# Patient Record
Sex: MALE | Race: WHITE | NOT HISPANIC OR LATINO | Employment: OTHER | URBAN - METROPOLITAN AREA
[De-identification: names, ages, dates, MRNs, and addresses within clinical notes are randomized per-mention and may not be internally consistent; named-entity substitution may affect disease eponyms.]

---

## 2022-06-07 DIAGNOSIS — M79.641 PAIN IN RIGHT HAND: Primary | ICD-10-CM

## 2023-03-04 ENCOUNTER — HOSPITAL ENCOUNTER (EMERGENCY)
Facility: HOSPITAL | Age: 43
Discharge: HOME | End: 2023-03-04
Attending: EMERGENCY MEDICINE
Payer: MEDICAID

## 2023-03-04 VITALS
DIASTOLIC BLOOD PRESSURE: 75 MMHG | HEIGHT: 69 IN | RESPIRATION RATE: 18 BRPM | SYSTOLIC BLOOD PRESSURE: 117 MMHG | WEIGHT: 257.25 LBS | OXYGEN SATURATION: 99 % | TEMPERATURE: 98 F | HEART RATE: 82 BPM | BODY MASS INDEX: 38.1 KG/M2

## 2023-03-04 DIAGNOSIS — I10 HYPERTENSION, UNSPECIFIED TYPE: Primary | ICD-10-CM

## 2023-03-04 DIAGNOSIS — R07.9 CHEST PAIN: ICD-10-CM

## 2023-03-04 LAB
ALBUMIN SERPL-MCNC: 4.2 G/DL (ref 3.5–5)
ALBUMIN/GLOB SERPL: 1.4 RATIO (ref 1.1–2)
ALP SERPL-CCNC: 74 UNIT/L (ref 40–150)
ALT SERPL-CCNC: 118 UNIT/L (ref 0–55)
AST SERPL-CCNC: 37 UNIT/L (ref 5–34)
BASOPHILS # BLD AUTO: 0.04 X10(3)/MCL (ref 0–0.2)
BASOPHILS NFR BLD AUTO: 0.6 %
BILIRUBIN DIRECT+TOT PNL SERPL-MCNC: 0.5 MG/DL
BNP BLD-MCNC: <10 PG/ML
BUN SERPL-MCNC: 14 MG/DL (ref 8.9–20.6)
CALCIUM SERPL-MCNC: 9.5 MG/DL (ref 8.4–10.2)
CHLORIDE SERPL-SCNC: 105 MMOL/L (ref 98–107)
CK MB SERPL-MCNC: 1.3 NG/ML
CK SERPL-CCNC: 143 U/L (ref 30–200)
CO2 SERPL-SCNC: 26 MMOL/L (ref 22–29)
CREAT SERPL-MCNC: 1.1 MG/DL (ref 0.73–1.18)
D DIMER PPP IA.FEU-MCNC: <0.27 UG/ML FEU (ref 0–0.5)
EOSINOPHIL # BLD AUTO: 0.17 X10(3)/MCL (ref 0–0.9)
EOSINOPHIL NFR BLD AUTO: 2.6 %
ERYTHROCYTE [DISTWIDTH] IN BLOOD BY AUTOMATED COUNT: 11.7 % (ref 11.5–17)
GFR SERPLBLD CREATININE-BSD FMLA CKD-EPI: >60 MLS/MIN/1.73/M2
GLOBULIN SER-MCNC: 3.1 GM/DL (ref 2.4–3.5)
GLUCOSE SERPL-MCNC: 89 MG/DL (ref 74–100)
HCT VFR BLD AUTO: 45.8 % (ref 42–52)
HGB BLD-MCNC: 15.3 G/DL (ref 14–18)
IMM GRANULOCYTES # BLD AUTO: 0.01 X10(3)/MCL (ref 0–0.04)
IMM GRANULOCYTES NFR BLD AUTO: 0.2 %
LYMPHOCYTES # BLD AUTO: 2.47 X10(3)/MCL (ref 0.6–4.6)
LYMPHOCYTES NFR BLD AUTO: 37.5 %
MCH RBC QN AUTO: 29.6 PG
MCHC RBC AUTO-ENTMCNC: 33.4 G/DL (ref 33–36)
MCV RBC AUTO: 88.6 FL (ref 80–94)
MONOCYTES # BLD AUTO: 0.57 X10(3)/MCL (ref 0.1–1.3)
MONOCYTES NFR BLD AUTO: 8.6 %
NEUTROPHILS # BLD AUTO: 3.33 X10(3)/MCL (ref 2.1–9.2)
NEUTROPHILS NFR BLD AUTO: 50.5 %
NRBC BLD AUTO-RTO: 0 %
PLATELET # BLD AUTO: 236 X10(3)/MCL (ref 130–400)
PMV BLD AUTO: 10.7 FL (ref 7.4–10.4)
POTASSIUM SERPL-SCNC: 3.8 MMOL/L (ref 3.5–5.1)
PROT SERPL-MCNC: 7.3 GM/DL (ref 6.4–8.3)
RBC # BLD AUTO: 5.17 X10(6)/MCL (ref 4.7–6.1)
SODIUM SERPL-SCNC: 141 MMOL/L (ref 136–145)
TROPONIN I SERPL-MCNC: <0.01 NG/ML (ref 0–0.04)
WBC # SPEC AUTO: 6.6 X10(3)/MCL (ref 4.5–11.5)

## 2023-03-04 PROCEDURE — 83880 ASSAY OF NATRIURETIC PEPTIDE: CPT | Performed by: PHYSICIAN ASSISTANT

## 2023-03-04 PROCEDURE — 25000242 PHARM REV CODE 250 ALT 637 W/ HCPCS: Performed by: PHYSICIAN ASSISTANT

## 2023-03-04 PROCEDURE — 85025 COMPLETE CBC W/AUTO DIFF WBC: CPT | Performed by: PHYSICIAN ASSISTANT

## 2023-03-04 PROCEDURE — 82550 ASSAY OF CK (CPK): CPT | Performed by: PHYSICIAN ASSISTANT

## 2023-03-04 PROCEDURE — 84484 ASSAY OF TROPONIN QUANT: CPT | Performed by: PHYSICIAN ASSISTANT

## 2023-03-04 PROCEDURE — 85379 FIBRIN DEGRADATION QUANT: CPT | Performed by: PHYSICIAN ASSISTANT

## 2023-03-04 PROCEDURE — 93005 ELECTROCARDIOGRAM TRACING: CPT

## 2023-03-04 PROCEDURE — 80053 COMPREHEN METABOLIC PANEL: CPT | Performed by: PHYSICIAN ASSISTANT

## 2023-03-04 PROCEDURE — 82553 CREATINE MB FRACTION: CPT | Performed by: PHYSICIAN ASSISTANT

## 2023-03-04 PROCEDURE — 99285 EMERGENCY DEPT VISIT HI MDM: CPT | Mod: 25

## 2023-03-04 RX ORDER — AMLODIPINE BESYLATE 5 MG/1
5 TABLET ORAL DAILY
Qty: 30 TABLET | Refills: 0 | Status: SHIPPED | OUTPATIENT
Start: 2023-03-04 | End: 2023-04-03

## 2023-03-04 RX ORDER — NITROGLYCERIN 0.4 MG/1
0.4 TABLET SUBLINGUAL
Status: COMPLETED | OUTPATIENT
Start: 2023-03-04 | End: 2023-03-04

## 2023-03-04 RX ADMIN — NITROGLYCERIN 0.4 MG: 0.4 TABLET SUBLINGUAL at 06:03

## 2023-03-05 NOTE — ED PROVIDER NOTES
"Encounter Date: 3/4/2023       History     Chief Complaint   Patient presents with    Chest Pain     C/o left chest pain radiating under left shoulder since yesterday. States recently started on nicotine patches     Krish Prieto is a 42 y.o. male who presents to the ED with complaints of chest pain that radiates to his left arm that started 1 day(s) ago off and on. 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 Recntly started wearing a nicotine patch. He states he feels short of breath and has off and on swelling in the bilateral lower extremities. States his mom passed away at 57 y/o from a heart attack and dad has heart disease. States he sees Dr. David Adams but only takes medication for bipolar.  He denies nausea, vomiting, dizziness, headache, syncope.     The history is provided by the patient. No  was used.   Review of patient's allergies indicates:   Allergen Reactions    Prednisone Other (See Comments)     "MAKES ME CRAZY"  Other reaction(s): .       History reviewed. No pertinent past medical history.  History reviewed. No pertinent surgical history.  History reviewed. No pertinent family history.  Social History     Tobacco Use    Smoking status: Former     Types: Cigarettes    Smokeless tobacco: Never   Substance Use Topics    Alcohol use: Not Currently    Drug use: Not Currently     Types: Marijuana, Methamphetamines     Review of Systems   Constitutional:  Negative for chills, fatigue and fever.   HENT:  Negative for congestion, ear pain, sinus pain and sore throat.    Eyes:  Negative for pain.   Respiratory:  Positive for shortness of breath. Negative for cough, chest tightness and wheezing.    Cardiovascular:  Positive for chest pain. Negative for leg swelling.   Gastrointestinal:  Negative for abdominal pain, constipation, diarrhea, nausea and vomiting.   Genitourinary:  Negative for dysuria and flank pain. "   Musculoskeletal:  Negative for back pain and joint swelling.   Skin:  Negative for color change and rash.   Neurological:  Negative for dizziness, weakness and headaches.   Psychiatric/Behavioral:  Negative for behavioral problems and confusion.      Physical Exam     Initial Vitals [03/04/23 1744]   BP Pulse Resp Temp SpO2   (!) 164/101 106 20 98.1 °F (36.7 °C) 98 %      MAP       --         Physical Exam    Nursing note and vitals reviewed.  Constitutional: He appears well-developed and well-nourished.   HENT:   Head: Normocephalic and atraumatic.   Eyes: Conjunctivae and EOM are normal. Pupils are equal, round, and reactive to light.   Neck: Neck supple. No JVD present.   Normal range of motion.  Cardiovascular:  Normal rate, regular rhythm, normal heart sounds and intact distal pulses.           No murmur heard.  Pulmonary/Chest: Breath sounds normal. No respiratory distress. He has no wheezes. He has no rhonchi. He has no rales.   Abdominal: Abdomen is soft. Bowel sounds are normal. He exhibits no distension. There is no abdominal tenderness. There is no rebound.   Musculoskeletal:         General: No tenderness. Normal range of motion.      Cervical back: Normal range of motion and neck supple.     Lymphadenopathy:     He has no cervical adenopathy.   Neurological: He is alert and oriented to person, place, and time. GCS score is 15. GCS eye subscore is 4. GCS verbal subscore is 5. GCS motor subscore is 6.   Skin: Skin is warm. Capillary refill takes less than 2 seconds.   Psychiatric: He has a normal mood and affect. Thought content normal.       ED Course   Procedures  Labs Reviewed   COMPREHENSIVE METABOLIC PANEL - Abnormal; Notable for the following components:       Result Value    Alanine Aminotransferase 118 (*)     Aspartate Aminotransferase 37 (*)     All other components within normal limits   CBC WITH DIFFERENTIAL - Abnormal; Notable for the following components:    MPV 10.7 (*)     All other  components within normal limits   TROPONIN I - Normal   CK - Normal   CK-MB - Normal   D DIMER, QUANTITATIVE - Normal   B-TYPE NATRIURETIC PEPTIDE - Normal   CBC W/ AUTO DIFFERENTIAL    Narrative:     The following orders were created for panel order CBC auto differential.  Procedure                               Abnormality         Status                     ---------                               -----------         ------                     CBC with Differential[[id number]]        Abnormal            Final result                 Please view results for these tests on the individual orders.     EKG Readings: (Independently Interpreted)   Initial Reading: No STEMI. Rhythm: Sinus Tachycardia. Heart Rate: 102. ST Segments: Normal ST Segments.   Nonspecific T wave abnormality   ECG Results              EKG 12-lead (Chest Pain) Age >30 (In process)  Result time 03/04/23 17:52:53      In process by Interface, Lab In [facility] (03/04/23 17:52:53)                   Narrative:    Test Reason : R07.9,    Vent. Rate : 102 BPM     Atrial Rate : 102 BPM     P-R Int : 150 ms          QRS Dur : 100 ms      QT Int : 360 ms       P-R-T Axes : 063 047 -02 degrees     QTc Int : 469 ms    Sinus tachycardia  Nonspecific T wave abnormality  Abnormal ECG  No previous ECGs available    Referred By:             Confirmed By:                                   Imaging Results              X-Ray Chest 1 View (Final result)  Result time 03/04/23 18:54:40      Final result by Juventino Ocampo MD (03/04/23 18:54:40)                   Impression:      NO ACUTE CARDIOPULMONARY PROCESS IDENTIFIED.      Electronically signed by: Juventino Ocampo  Date:    03/04/2023  Time:    18:54               Narrative:    EXAMINATION:  XR CHEST 1 VIEW    CLINICAL HISTORY:  chest pain;    TECHNIQUE:  One view    COMPARISON:  October 27, 2020.    FINDINGS:  Cardiopericardial silhouette is within normal limits. Lungs are without dense focal or segmental consolidation,  congestive process, pleural effusions or pneumothorax.                                       Medications   nitroGLYCERIN SL tablet 0.4 mg (0.4 mg Sublingual Given 3/4/23 1849)                 ED Course as of 03/04/23 2043   Sat Mar 04, 2023   2040 Reassessed patient at this time. BP now 112/76 after nitro and pain 0/10. Will refer to cardiology to get established and will DC home with low dose blood pressure medication. Strict ED return precautions. He verbalized understanding. Stable for discharge.  [VJ]      ED Course User Index  [VJ] Dottie Giron PA-C                 Clinical Impression:   Final diagnoses:  [R07.9] Chest pain  [I10] Hypertension, unspecified type (Primary)        ED Disposition Condition    Discharge Stable          ED Prescriptions       Medication Sig Dispense Start Date End Date Auth. Provider    amLODIPine (NORVASC) 5 MG tablet Take 1 tablet (5 mg total) by mouth once daily. 30 tablet 3/4/2023 4/3/2023 Dottie Giron PA-C          Follow-up Information       Follow up With Specialties Details Why Contact Info    [facility]  In 1 week  [address] [id number]    [facility] Emergency Medicine In 3 days As needed, If symptoms worsen [address] [address] [phone]             Dottie Giron PA-C  03/04/23 2043

## 2023-05-15 NOTE — PROGRESS NOTES
CHIEF COMPLAINT:   Chief Complaint   Patient presents with    New patient for c/o chest pain     Patient c/o having occasional tightness in chest                                                  HPI:  Krish Prieto 43 y.o. male who presents to [facility] as an initial referral for further evaluation of chest pain.  The patient had an emergency room visit in 2023 with complaints of chest pain and shortness of breath with minimal to moderate exertion. The patient reports his symptoms began while in rehab.  He reports that he was talking in a group setting, when he developed a left-sided tightness/shooting non- radiating pain.  Upon arrival to the ED, the patient was noted to be markedly hypertensive with blood pressure 164/101. Troponin was negative. EKG - ST, with nonspecific T-wave abnormality.  He reports that he was given nitroglycerin which ultimately relieved his symptoms.    It appears the patient was discharged with amlodipine.  However, he is currently not taking the medicine because his blood pressure is managed with diet and exercise modifications.      Today the patient denies any further episodes of chest pain.  He reports that his symptoms of shortness of breath have significantly the improve since he has quit smoking he in 2023.  He denies any current palpitations orthopnea, PND, peripheral edema, claudication symptoms, lightheadedness or syncope.  Patient states that he walks a mi daily and weight training sternal times a week without experiencing any chest pain or significant shortness of breath.  The patient does endorse if significant family history CAD, states that his mild has CAD/CABG  of a MI at age 56 and a has PAD.           Family History: Mom- CAD/CABG/PAD;  of 56 of MI; Dad-PAD;   Social History: Smoked a pack a day of cigarettes since age 14. Report quit smoking in 2023. Former methamphetamine and marijuana use. Sober for 93 days. Denies any  "ETOH use                                                                                                                                                                                                                                                                                                                                                                                                                                                                            CARDIAC TESTING:         Patient Active Problem List   Diagnosis    Chest pain     History reviewed. No pertinent surgical history.  Social History     Socioeconomic History    Marital status:    Tobacco Use    Smoking status: Former     Types: Cigarettes    Smokeless tobacco: Never   Substance and Sexual Activity    Alcohol use: Not Currently    Drug use: Not Currently     Types: Marijuana, Methamphetamines        History reviewed. No pertinent family history.  Review of patient's allergies indicates:   Allergen Reactions    Prednisone Other (See Comments)     "MAKES ME CRAZY"  Other reaction(s): .           ROS:  Review of Systems   Constitutional: Negative.    HENT: Negative.     Eyes: Negative.    Respiratory:  Positive for shortness of breath.    Cardiovascular:  Positive for chest pain.   Gastrointestinal: Negative.    Genitourinary: Negative.    Musculoskeletal: Negative.    Skin: Negative.    Neurological: Negative.    Endo/Heme/Allergies: Negative.    Psychiatric/Behavioral: Negative.                                                                                                                                                                                Negative except as stated in the history of present illness. See HPI for details.    PHYSICAL EXAM:  Visit Vitals  /80 (BP Location: Left arm, Patient Position: Sitting, BP Method: X-Large (Automatic))   Pulse 84   Temp 98.5 °F (36.9 °C) (Oral)   Resp 20   Ht 5' 9" (1.753 m)   Wt " "116.8 kg (257 lb 8 oz)   SpO2 96%   BMI 38.03 kg/m²       Physical Exam  Constitutional:       Appearance: Normal appearance.   HENT:      Head: Normocephalic.   Eyes:      Pupils: Pupils are equal, round, and reactive to light.   Cardiovascular:      Rate and Rhythm: Normal rate and regular rhythm.      Pulses: Normal pulses.   Pulmonary:      Effort: Pulmonary effort is normal.   Musculoskeletal:         General: Normal range of motion.   Skin:     General: Skin is warm and dry.   Neurological:      General: No focal deficit present.      Mental Status: He is alert and oriented to person, place, and time.   Psychiatric:         Mood and Affect: Mood normal.         Behavior: Behavior normal.     Current Outpatient Medications   Medication Instructions    amLODIPine (NORVASC) 5 mg, Oral, Daily        All medications, laboratory studies, cardiac diagnostic imaging reviewed.     Lab Results   Component Value Date    CREATININE 1.10 03/04/2023    K 3.8 03/04/2023        ASSESSMENT/PLAN:  Chest pain - atypical and typical features  - ED visit in March 2023 with complaints of left-sided chest tightness/shooting nonradiating pain.  Relieved with sublingual nitroglycerin. (See HPI)  - Denies any further episodes of chest pain.  - Will obtain treadmill stress test.  Patient has risk factors of HTN, tobacco use, obesity and family history of CAD    LOPEZ  - previously endorsed exertional dyspnea minimal activity no significant improvement since quit smoking March 2023.  - Will obtain baseline echocardiogram to assess LV function and check for any structural/valvular abnormalities    HTN  - BP at goal today   - Discharged with amlodipine 5 mg from ED.  However, patient reports he was unable to tolerate the medication. States "made him feel sick".   - Instructed patient to monitor BP log and schedule appointment with PCP to ensure adequate BP control  - Encouraged patient to continue with lifestyle modifications including diet " and exercise    Tobacco use   - Reports quit smoking in March 2023  - counseled on importance of continued smoking cessation       TST  ECHO  Follow up in cardiology clinic in 3 months or sooner pending results   Follow up with PCP as directed

## 2023-05-16 ENCOUNTER — OFFICE VISIT (OUTPATIENT)
Dept: CARDIOLOGY | Facility: CLINIC | Age: 43
End: 2023-05-16
Payer: MEDICAID

## 2023-05-16 VITALS
BODY MASS INDEX: 38.14 KG/M2 | HEIGHT: 69 IN | WEIGHT: 257.5 LBS | RESPIRATION RATE: 20 BRPM | HEART RATE: 84 BPM | DIASTOLIC BLOOD PRESSURE: 80 MMHG | TEMPERATURE: 99 F | OXYGEN SATURATION: 96 % | SYSTOLIC BLOOD PRESSURE: 136 MMHG

## 2023-05-16 DIAGNOSIS — I10 HYPERTENSION, UNSPECIFIED TYPE: ICD-10-CM

## 2023-05-16 DIAGNOSIS — R07.9 CHEST PAIN: ICD-10-CM

## 2023-05-16 PROCEDURE — 3075F SYST BP GE 130 - 139MM HG: CPT | Mod: CPTII,,, | Performed by: NURSE PRACTITIONER

## 2023-05-16 PROCEDURE — 1159F MED LIST DOCD IN RCRD: CPT | Mod: CPTII,,, | Performed by: NURSE PRACTITIONER

## 2023-05-16 PROCEDURE — 1160F PR REVIEW ALL MEDS BY PRESCRIBER/CLIN PHARMACIST DOCUMENTED: ICD-10-PCS | Mod: CPTII,,, | Performed by: NURSE PRACTITIONER

## 2023-05-16 PROCEDURE — 3008F BODY MASS INDEX DOCD: CPT | Mod: CPTII,,, | Performed by: NURSE PRACTITIONER

## 2023-05-16 PROCEDURE — 99204 OFFICE O/P NEW MOD 45 MIN: CPT | Mod: S$PBB,,, | Performed by: NURSE PRACTITIONER

## 2023-05-16 PROCEDURE — 1160F RVW MEDS BY RX/DR IN RCRD: CPT | Mod: CPTII,,, | Performed by: NURSE PRACTITIONER

## 2023-05-16 PROCEDURE — 3079F DIAST BP 80-89 MM HG: CPT | Mod: CPTII,,, | Performed by: NURSE PRACTITIONER

## 2023-05-16 PROCEDURE — 3008F PR BODY MASS INDEX (BMI) DOCUMENTED: ICD-10-PCS | Mod: CPTII,,, | Performed by: NURSE PRACTITIONER

## 2023-05-16 PROCEDURE — 1159F PR MEDICATION LIST DOCUMENTED IN MEDICAL RECORD: ICD-10-PCS | Mod: CPTII,,, | Performed by: NURSE PRACTITIONER

## 2023-05-16 PROCEDURE — 99214 OFFICE O/P EST MOD 30 MIN: CPT | Mod: PBBFAC | Performed by: NURSE PRACTITIONER

## 2023-05-16 PROCEDURE — 99204 PR OFFICE/OUTPT VISIT, NEW, LEVL IV, 45-59 MIN: ICD-10-PCS | Mod: S$PBB,,, | Performed by: NURSE PRACTITIONER

## 2023-05-16 PROCEDURE — 3079F PR MOST RECENT DIASTOLIC BLOOD PRESSURE 80-89 MM HG: ICD-10-PCS | Mod: CPTII,,, | Performed by: NURSE PRACTITIONER

## 2023-05-16 PROCEDURE — 3075F PR MOST RECENT SYSTOLIC BLOOD PRESS GE 130-139MM HG: ICD-10-PCS | Mod: CPTII,,, | Performed by: NURSE PRACTITIONER

## 2023-05-16 NOTE — PATIENT INSTRUCTIONS
TST  ECHO  Follow up in cardiology clinic in 3 months or sooner pending results   Follow up with PCP as directed

## 2023-06-01 ENCOUNTER — HOSPITAL ENCOUNTER (OUTPATIENT)
Dept: CARDIOLOGY | Facility: HOSPITAL | Age: 43
Discharge: HOME | End: 2023-06-01
Attending: NURSE PRACTITIONER
Payer: MEDICAID

## 2023-06-01 VITALS
BODY MASS INDEX: 38.14 KG/M2 | SYSTOLIC BLOOD PRESSURE: 138 MMHG | HEART RATE: 75 BPM | DIASTOLIC BLOOD PRESSURE: 87 MMHG | WEIGHT: 257.5 LBS | HEIGHT: 69 IN | RESPIRATION RATE: 20 BRPM

## 2023-06-01 DIAGNOSIS — R07.9 CHEST PAIN: ICD-10-CM

## 2023-06-01 LAB
CV STRESS BASE HR: 76 BPM
DIASTOLIC BLOOD PRESSURE: 87 MMHG
OHS CV CPX 85 PERCENT MAX PREDICTED HEART RATE MALE: 152
OHS CV CPX ESTIMATED METS: 12
OHS CV CPX MAX PREDICTED HEART RATE: 179
OHS CV CPX PATIENT IS FEMALE: 0
OHS CV CPX PATIENT IS MALE: 1
OHS CV CPX PEAK DIASTOLIC BLOOD PRESSURE: 68 MMHG
OHS CV CPX PEAK HEAR RATE: 166 BPM
OHS CV CPX PEAK RATE PRESSURE PRODUCT: NORMAL
OHS CV CPX PEAK SYSTOLIC BLOOD PRESSURE: 204 MMHG
OHS CV CPX PERCENT MAX PREDICTED HEART RATE ACHIEVED: 93
OHS CV CPX RATE PRESSURE PRODUCT PRESENTING: NORMAL
STRESS ECHO POST EXERCISE DUR MIN: 9 MINUTES
STRESS ECHO POST EXERCISE DUR SEC: 37 SECONDS
SYSTOLIC BLOOD PRESSURE: 138 MMHG

## 2023-06-01 PROCEDURE — 93017 CV STRESS TEST TRACING ONLY: CPT

## 2023-06-16 ENCOUNTER — HOSPITAL ENCOUNTER (OUTPATIENT)
Dept: CARDIOLOGY | Facility: HOSPITAL | Age: 43
Discharge: HOME | End: 2023-06-16
Attending: NURSE PRACTITIONER
Payer: MEDICAID

## 2023-06-16 VITALS — WEIGHT: 257 LBS | SYSTOLIC BLOOD PRESSURE: 128 MMHG | DIASTOLIC BLOOD PRESSURE: 72 MMHG | BODY MASS INDEX: 37.95 KG/M2

## 2023-06-16 DIAGNOSIS — R07.9 CHEST PAIN: ICD-10-CM

## 2023-06-16 LAB
AV INDEX (PROSTH): 0.73
AV MEAN GRADIENT: 2 MMHG
AV PEAK GRADIENT: 3 MMHG
AV VELOCITY RATIO: 0.76
CV ECHO LV RWT: 0.34 CM
DOP CALC AO PEAK VEL: 0.92 M/S
DOP CALC AO VTI: 18 CM
DOP CALC LVOT PEAK VEL: 0.7 M/S
DOP CALC MV VTI: 27.1 CM
DOP CALCLVOT PEAK VEL VTI: 13.1 CM
E WAVE DECELERATION TIME: 177.42 MSEC
E/A RATIO: 1.96
E/E' RATIO: 6.77 M/S
ECHO LV POSTERIOR WALL: 0.89 CM (ref 0.6–1.1)
EJECTION FRACTION: 50 %
FRACTIONAL SHORTENING: 27 % (ref 28–44)
INTERVENTRICULAR SEPTUM: 1.1 CM (ref 0.6–1.1)
LEFT INTERNAL DIMENSION IN SYSTOLE: 3.85 CM (ref 2.1–4)
LEFT VENTRICLE DIASTOLIC VOLUME: 132.48 ML
LEFT VENTRICLE SYSTOLIC VOLUME: 63.81 ML
LEFT VENTRICULAR INTERNAL DIMENSION IN DIASTOLE: 5.25 CM (ref 3.5–6)
LEFT VENTRICULAR MASS: 195.95 G
LV LATERAL E/E' RATIO: 5.87 M/S
LV SEPTAL E/E' RATIO: 8 M/S
LVOT MG: 1 MMHG
LVOT MV: 0.47 CM/S
MV MEAN GRADIENT: 1 MMHG
MV PEAK A VEL: 0.45 M/S
MV PEAK E VEL: 0.88 M/S
MV PEAK GRADIENT: 3 MMHG
MV STENOSIS PRESSURE HALF TIME: 51.45 MS
MV VALVE AREA P 1/2 METHOD: 4.28 CM2
OHS LV EJECTION FRACTION SIMPSONS BIPLANE MOD: 5 %
PISA TR MAX VEL: 1.47 M/S
RA PRESSURE: 3 MMHG
TDI LATERAL: 0.15 M/S
TDI SEPTAL: 0.11 M/S
TDI: 0.13 M/S
TR MAX PG: 9 MMHG
TV REST PULMONARY ARTERY PRESSURE: 12 MMHG

## 2023-06-16 PROCEDURE — 93306 TTE W/DOPPLER COMPLETE: CPT

## 2024-11-14 DIAGNOSIS — R22.9 LOCALIZED SUPERFICIAL SWELLING, MASS, OR LUMP: Primary | ICD-10-CM

## 2024-12-06 ENCOUNTER — HOSPITAL ENCOUNTER (OUTPATIENT)
Dept: RADIOLOGY | Facility: HOSPITAL | Age: 44
Discharge: HOME | End: 2024-12-06
Attending: NURSE PRACTITIONER
Payer: MEDICAID

## 2024-12-06 DIAGNOSIS — R22.9 LOCALIZED SUPERFICIAL SWELLING, MASS, OR LUMP: ICD-10-CM

## 2024-12-06 PROCEDURE — 76705 ECHO EXAM OF ABDOMEN: CPT | Mod: TC

## 2024-12-06 PROCEDURE — 76604 US EXAM CHEST: CPT | Mod: TC

## 2024-12-10 ENCOUNTER — HOSPITAL ENCOUNTER (EMERGENCY)
Facility: HOSPITAL | Age: 44
Discharge: HOME | End: 2024-12-10
Attending: INTERNAL MEDICINE
Payer: MEDICAID

## 2024-12-10 VITALS
BODY MASS INDEX: 35.25 KG/M2 | HEART RATE: 75 BPM | SYSTOLIC BLOOD PRESSURE: 151 MMHG | HEIGHT: 69 IN | RESPIRATION RATE: 16 BRPM | TEMPERATURE: 98 F | OXYGEN SATURATION: 97 % | DIASTOLIC BLOOD PRESSURE: 84 MMHG | WEIGHT: 238 LBS

## 2024-12-10 DIAGNOSIS — R42 DIZZINESS: ICD-10-CM

## 2024-12-10 DIAGNOSIS — R07.9 CHEST PAIN: ICD-10-CM

## 2024-12-10 DIAGNOSIS — I10 PRIMARY HYPERTENSION: Primary | ICD-10-CM

## 2024-12-10 LAB
ALBUMIN SERPL-MCNC: 4 G/DL (ref 3.5–5)
ALBUMIN/GLOB SERPL: 1.1 RATIO (ref 1.1–2)
ALP SERPL-CCNC: 70 UNIT/L (ref 40–150)
ALT SERPL-CCNC: 31 UNIT/L (ref 0–55)
ANION GAP SERPL CALC-SCNC: 8 MEQ/L
AST SERPL-CCNC: 20 UNIT/L (ref 5–34)
BASOPHILS # BLD AUTO: 0.06 X10(3)/MCL
BASOPHILS NFR BLD AUTO: 0.9 %
BILIRUB SERPL-MCNC: 0.7 MG/DL
BNP BLD-MCNC: 13.5 PG/ML
BUN SERPL-MCNC: 18 MG/DL (ref 8.9–20.6)
CALCIUM SERPL-MCNC: 9.5 MG/DL (ref 8.4–10.2)
CHLORIDE SERPL-SCNC: 106 MMOL/L (ref 98–107)
CO2 SERPL-SCNC: 27 MMOL/L (ref 22–29)
CREAT SERPL-MCNC: 0.95 MG/DL (ref 0.72–1.25)
CREAT/UREA NIT SERPL: 19
D DIMER PPP IA.FEU-MCNC: <0.27 UG/ML FEU (ref 0–0.5)
EOSINOPHIL # BLD AUTO: 0.16 X10(3)/MCL (ref 0–0.9)
EOSINOPHIL NFR BLD AUTO: 2.5 %
ERYTHROCYTE [DISTWIDTH] IN BLOOD BY AUTOMATED COUNT: 11.9 % (ref 11.5–17)
GFR SERPLBLD CREATININE-BSD FMLA CKD-EPI: >60 ML/MIN/1.73/M2
GLOBULIN SER-MCNC: 3.5 GM/DL (ref 2.4–3.5)
GLUCOSE SERPL-MCNC: 111 MG/DL (ref 74–100)
HCT VFR BLD AUTO: 47 % (ref 42–52)
HGB BLD-MCNC: 16.3 G/DL (ref 14–18)
IMM GRANULOCYTES # BLD AUTO: 0.02 X10(3)/MCL (ref 0–0.04)
IMM GRANULOCYTES NFR BLD AUTO: 0.3 %
LYMPHOCYTES # BLD AUTO: 1.64 X10(3)/MCL (ref 0.6–4.6)
LYMPHOCYTES NFR BLD AUTO: 25.1 %
MCH RBC QN AUTO: 31 PG (ref 27–31)
MCHC RBC AUTO-ENTMCNC: 34.7 G/DL (ref 33–36)
MCV RBC AUTO: 89.4 FL (ref 80–94)
MONOCYTES # BLD AUTO: 0.52 X10(3)/MCL (ref 0.1–1.3)
MONOCYTES NFR BLD AUTO: 8 %
NEUTROPHILS # BLD AUTO: 4.13 X10(3)/MCL (ref 2.1–9.2)
NEUTROPHILS NFR BLD AUTO: 63.2 %
OHS QRS DURATION: 102 MS
OHS QTC CALCULATION: 428 MS
PLATELET # BLD AUTO: 285 X10(3)/MCL (ref 130–400)
PMV BLD AUTO: 10.2 FL (ref 7.4–10.4)
POTASSIUM SERPL-SCNC: 4.1 MMOL/L (ref 3.5–5.1)
PROT SERPL-MCNC: 7.5 GM/DL (ref 6.4–8.3)
RBC # BLD AUTO: 5.26 X10(6)/MCL (ref 4.7–6.1)
SODIUM SERPL-SCNC: 141 MMOL/L (ref 136–145)
TROPONIN I SERPL-MCNC: <0.01 NG/ML (ref 0–0.04)
WBC # BLD AUTO: 6.53 X10(3)/MCL (ref 4.5–11.5)

## 2024-12-10 PROCEDURE — 85025 COMPLETE CBC W/AUTO DIFF WBC: CPT | Performed by: INTERNAL MEDICINE

## 2024-12-10 PROCEDURE — 80053 COMPREHEN METABOLIC PANEL: CPT | Performed by: INTERNAL MEDICINE

## 2024-12-10 PROCEDURE — 83880 ASSAY OF NATRIURETIC PEPTIDE: CPT | Performed by: INTERNAL MEDICINE

## 2024-12-10 PROCEDURE — 93005 ELECTROCARDIOGRAM TRACING: CPT

## 2024-12-10 PROCEDURE — 25000003 PHARM REV CODE 250: Performed by: INTERNAL MEDICINE

## 2024-12-10 PROCEDURE — 99285 EMERGENCY DEPT VISIT HI MDM: CPT | Mod: 25

## 2024-12-10 PROCEDURE — 93010 ELECTROCARDIOGRAM REPORT: CPT | Mod: ,,, | Performed by: INTERNAL MEDICINE

## 2024-12-10 PROCEDURE — 84484 ASSAY OF TROPONIN QUANT: CPT | Performed by: INTERNAL MEDICINE

## 2024-12-10 PROCEDURE — 85379 FIBRIN DEGRADATION QUANT: CPT | Performed by: INTERNAL MEDICINE

## 2024-12-10 RX ORDER — HYDROCHLOROTHIAZIDE 25 MG/1
25 TABLET ORAL
Status: COMPLETED | OUTPATIENT
Start: 2024-12-10 | End: 2024-12-10

## 2024-12-10 RX ORDER — HYDROCHLOROTHIAZIDE 25 MG/1
25 TABLET ORAL DAILY
Qty: 30 TABLET | Refills: 0 | Status: SHIPPED | OUTPATIENT
Start: 2024-12-10

## 2024-12-10 RX ORDER — ASPIRIN 325 MG
325 TABLET ORAL
Status: COMPLETED | OUTPATIENT
Start: 2024-12-10 | End: 2024-12-10

## 2024-12-10 RX ADMIN — HYDROCHLOROTHIAZIDE 25 MG: 25 TABLET ORAL at 04:12

## 2024-12-10 RX ADMIN — ASPIRIN 325 MG ORAL TABLET 325 MG: 325 PILL ORAL at 03:12

## 2024-12-10 NOTE — Clinical Note
"Krish Yadav" Conner was seen and treated in our emergency department on 12/10/2024.  He may return to work on 12/12/2024.       If you have any questions or concerns, please don't hesitate to call.      Kevin Casarez, DO"

## 2024-12-10 NOTE — ED PROVIDER NOTES
"Encounter Date: 12/10/2024       History     Chief Complaint   Patient presents with    Chest Pain    Hypertension     C/o being placed on Lisinopril 3 days ago and ever seen been having blurred vision on and off and blood pressure still high and also has left side chest pain he describes as burning     44-year-old male with a past medical history of hypertension who presents for chest pain and elevated blood pressure.  Patient reports 3 days ago he was started on lisinopril for asymptomatic hypertension.  He reports since starting that he has been feeling unwell.  He has been noting intermittent pain on his left chest that radiates to the left arm, dizziness, lightheadedness and general malaise.  He states prior to this he had no complaints.  He stopped taking the lisinopril last dose was yesterday.  He denies fevers, chills, nausea, vomiting, shortness of breath, abdominal pain, diarrhea, extremity swelling, extremity weakness, numbness/tingling, headache, visual changes, neck pain, back pain, syncope.    The history is provided by the patient.     Review of patient's allergies indicates:   Allergen Reactions    Prednisone Other (See Comments)     "MAKES ME CRAZY"  Other reaction(s): .       History reviewed. No pertinent past medical history.  History reviewed. No pertinent surgical history.  No family history on file.  Social History     Tobacco Use    Smoking status: Former     Types: Cigarettes    Smokeless tobacco: Never   Substance Use Topics    Alcohol use: Not Currently    Drug use: Not Currently     Types: Marijuana, Methamphetamines     Review of Systems   All other systems reviewed and are negative.      Physical Exam     Initial Vitals [12/10/24 1322]   BP Pulse Resp Temp SpO2   (!) 166/95 89 19 98.2 °F (36.8 °C) 97 %      MAP       --         Physical Exam    Constitutional: He appears well-developed and well-nourished. He is cooperative.  Non-toxic appearance. He does not appear ill.   HENT:   Head: " Normocephalic and atraumatic.   Right Ear: Hearing, tympanic membrane, external ear and ear canal normal.   Left Ear: Hearing, tympanic membrane, external ear and ear canal normal.   Nose: Nose normal. Mouth/Throat: Uvula is midline, oropharynx is clear and moist and mucous membranes are normal.   Eyes: Conjunctivae, EOM and lids are normal. Pupils are equal, round, and reactive to light.   Neck: Trachea normal and phonation normal. Neck supple.    Full passive range of motion without pain.     Cardiovascular:  Normal rate, regular rhythm, normal heart sounds and normal pulses.           No murmur heard.  Pulmonary/Chest: No accessory muscle usage. No tachypnea. No respiratory distress. He has no decreased breath sounds. He has no wheezes. He has no rhonchi. He has no rales. He exhibits no mass, no tenderness, no bony tenderness, no crepitus and no swelling.   Abdominal: Abdomen is soft. Bowel sounds are normal. He exhibits no distension. There is no abdominal tenderness. No hernia. There is no rebound and no guarding.   Musculoskeletal:      Cervical back: Full passive range of motion without pain and neck supple.     Neurological: He is alert and oriented to person, place, and time. GCS eye subscore is 4. GCS verbal subscore is 5. GCS motor subscore is 6.   Skin: Skin is warm, dry and intact. Capillary refill takes less than 2 seconds. No rash noted.   Psychiatric: He has a normal mood and affect. His speech is normal and behavior is normal. Judgment and thought content normal. Cognition and memory are normal.         ED Course   Procedures  Labs Reviewed   COMPREHENSIVE METABOLIC PANEL - Abnormal       Result Value    Sodium 141      Potassium 4.1      Chloride 106      CO2 27      Glucose 111 (*)     Blood Urea Nitrogen 18.0      Creatinine 0.95      Calcium 9.5      Protein Total 7.5      Albumin 4.0      Globulin 3.5      Albumin/Globulin Ratio 1.1      Bilirubin Total 0.7      ALP 70      ALT 31      AST 20       eGFR >60      Anion Gap 8.0      BUN/Creatinine Ratio 19     TROPONIN I - Normal    Troponin-I <0.010     B-TYPE NATRIURETIC PEPTIDE - Normal    Natriuretic Peptide 13.5     D DIMER, QUANTITATIVE - Normal    D-Dimer <0.27     CBC W/ AUTO DIFFERENTIAL    Narrative:     The following orders were created for panel order CBC auto differential.  Procedure                               Abnormality         Status                     ---------                               -----------         ------                     CBC with Differential[[id number]]                           Final result                 Please view results for these tests on the individual orders.   CBC WITH DIFFERENTIAL    WBC 6.53      RBC 5.26      Hgb 16.3      Hct 47.0      MCV 89.4      MCH 31.0      MCHC 34.7      RDW 11.9      Platelet 285      MPV 10.2      Neut % 63.2      Lymph % 25.1      Mono % 8.0      Eos % 2.5      Basophil % 0.9      Lymph # 1.64      Neut # 4.13      Mono # 0.52      Eos # 0.16      Baso # 0.06      IG# 0.02      IG% 0.3       EKG Readings: (Independently Interpreted)   Normal sinus rhythm, sinus arrhythmia, rate of 85 beats per minute.  No ischemic changes or arrhythmia.     ECG Results              EKG 12-lead (Final result)        Collection Time Result Time QRS Duration OHS QTC Calculation    12/10/24 13:17:59 12/10/24 15:44:13 102 428                     Final result by Interface, Lab In [facility] (12/10/24 15:44:25)                   Narrative:    Test Reason : R07.9,    Vent. Rate :  85 BPM     Atrial Rate :  85 BPM     P-R Int : 146 ms          QRS Dur : 102 ms      QT Int : 360 ms       P-R-T Axes :  65  27  46 degrees    QTcB Int : 428 ms    Normal sinus rhythm with sinus arrhythmia  Normal ECG  When compared with ECG of 01-Jun-2023 10:53,  No significant change was found  Confirmed by Ahsan Quiroz ([id number]) on 12/10/2024 3:44:12 PM    Referred By:            Confirmed By: Ahsan Quiroz                                   Imaging Results              X-Ray Chest PA And Lateral (Final result)  Result time 12/10/24 16:11:57      Final result by Duy Day MD (12/10/24 16:11:57)                   Impression:      1. No active cardiopulmonary disease identified      Electronically signed by: Duy Day  Date:    12/10/2024  Time:    16:11               Narrative:    EXAMINATION:  XR CHEST PA AND LATERAL    CLINICAL HISTORY:  Chest Pain;, .    COMPARISON:  03/04/2023    FINDINGS:  PA/AP and lateral views reveal the heart to be normal in size.  The trachea is midline.  No definite infiltrate or effusion is seen.  Degenerative changes are noted to the thoracic spine.                                       Medications   aspirin tablet 325 mg (325 mg Oral Given 12/10/24 1508)   hydroCHLOROthiazide tablet 25 mg (25 mg Oral Given 12/10/24 8627)     Medical Decision Making  Differential diagnosis includes hypertension, ACS, anxiety, PE    44-year-old male presenting with chest pain and elevated blood pressure.  Vital signs show blood pressure 166/95, otherwise stable and afebrile.  Labs are without leukocytosis, anemia, abnormal electrolytes, abnormal renal function, elevated troponin, elevated D-dimer.  EKGs without ischemic changes or arrhythmia.  Chest x-ray is without pneumonia, pneumothorax, effusions, pneumomediastinum, wide mediastinum, edema.   Results discussed with patient, informed him etiology is unknown but possibly due to elevated blood pressure versus medication effect.  Was given a dose of hydrochlorothiazide, will be discharged with this as he felt improved after taking it.  After shared decision-making, patient would prefer outpatient follow-up.  Stressed the importance of close follow-up with PCP.  Strict return precautions given.  Patient verbalized an understanding of the plan and agreed.    Problems Addressed:  Chest pain: undiagnosed new problem with uncertain prognosis  Dizziness: undiagnosed new problem  with uncertain prognosis  Primary hypertension: chronic illness or injury    Amount and/or Complexity of Data Reviewed  External Data Reviewed: labs, ECG and notes.  Labs: ordered. Decision-making details documented in ED Course.  Radiology: ordered and independent interpretation performed. Decision-making details documented in ED Course.  ECG/medicine tests: ordered and independent interpretation performed. Decision-making details documented in ED Course.     Details: Normal sinus rhythm, sinus arrhythmia, rate of 85 beats per minute.  No ischemic changes or arrhythmia.      Risk  OTC drugs.  Prescription drug management.  Decision regarding hospitalization.  Risk Details: Normal sinus rhythm, sinus arrhythmia, rate of 85 beats per minute.  No ischemic changes or arrhythmia.                                      Clinical Impression:  Final diagnoses:  [R07.9] Chest pain  [I10] Primary hypertension (Primary)  [R42] Dizziness          ED Disposition Condition    Discharge Stable          ED Prescriptions       Medication Sig Dispense Start Date End Date Auth. Provider    hydroCHLOROthiazide (HYDRODIURIL) 25 MG tablet Take 1 tablet (25 mg total) by mouth once daily. 30 tablet 12/10/2024 -- Kevin Casarez DO          Follow-up Information       Follow up With Specialties Details Why Contact [facility]  Call today For follow-up If you do not have a [phone], call to find a doctor             Kevin Casarez DO  12/10/24 8318

## 2024-12-10 NOTE — DISCHARGE INSTRUCTIONS
Please follow-up with your PCP at her already scheduled appointment next week.  If needed, contact your PCP today or tomorrow to see if there are any sooner appointments.

## 2024-12-18 PROBLEM — M79.89 MASS OF SOFT TISSUE: Status: ACTIVE | Noted: 2024-12-18

## 2024-12-18 RX ORDER — TESTOSTERONE CYPIONATE 1000 MG/10ML
200 INJECTION, SOLUTION INTRAMUSCULAR
COMMUNITY

## 2024-12-18 RX ORDER — DEXTROAMPHETAMINE SACCHARATE, AMPHETAMINE ASPARTATE MONOHYDRATE, DEXTROAMPHETAMINE SULFATE AND AMPHETAMINE SULFATE 5; 5; 5; 5 MG/1; MG/1; MG/1; MG/1
20 CAPSULE, EXTENDED RELEASE ORAL EVERY MORNING
COMMUNITY

## 2024-12-18 NOTE — H&P (VIEW-ONLY)
"History & Physical    Subjective     History of Present Illness:  Patient is a 44 y.o. male presents with symptomatic soft tissue mass of the right flank.  Patient states that he noticed a proximally 2-3 months ago.  It causes the pain discomfort with touch and pressure overlying the region.  He denies traumatic injury toxic chemical radiation exposures past.  He does have family history of malignancy in his grandfather with breast cancer as well as lymphoma.  He shows no outward signs of related diseases at this time.  It did undergo an ultrasound which did show subcutaneous soft tissue mass.  We have discussed elective excision for therapeutic and diagnostic evaluation.  Consent for surgery has been obtained after risks benefits and alternatives to procedure explained and all questions answered.     Chief Complaint   Patient presents with    Surgical Consult     Loren Michaels referral, lipoma of chest/ lower back area       Review of patient's allergies indicates:   Allergen Reactions    Prednisone Other (See Comments)     "MAKES ME CRAZY"  Other reaction(s): .         Current Outpatient Medications   Medication Sig Dispense Refill    hydroCHLOROthiazide (HYDRODIURIL) 25 MG tablet Take 1 tablet (25 mg total) by mouth once daily. 30 tablet 0    amLODIPine (NORVASC) 5 MG tablet Take 1 tablet (5 mg total) by mouth once daily. (Patient not taking: Reported on 5/16/2023) 30 tablet 0     No current facility-administered medications for this visit.       Past Medical History:   Diagnosis Date    Lipoma of chest wall      No past surgical history on file.  Family History   Problem Relation Name Age of Onset    Cancer Father          prostate    Cancer Maternal Grandfather          breast cancer in male    Cancer Paternal Grandfather          lymphoma     Social History     Tobacco Use    Smoking status: Former     Types: Cigarettes    Smokeless tobacco: Never   Substance Use Topics    Alcohol use: Not Currently    Drug use: " "Not Currently     Types: Marijuana, Methamphetamines        Review of Systems:  Review of Systems   All other systems reviewed and are negative.       Objective     Vital Signs (Most Recent)  Temp: 97 °F (36.1 °C) (12/18/24 1027)  Pulse: 91 (12/18/24 1027)  Resp: 16 (12/18/24 1027)  BP: (!) 145/92 (12/18/24 1027)  SpO2: 97 % (12/18/24 1027)  5' 9" (1.753 m)  108 kg (238 lb 1.6 oz)     Physical Exam:  Physical Exam  Vitals reviewed.   Constitutional:       Appearance: Normal appearance.          Comments: Palpable partially fixated soft tissue mass of the right flank   HENT:      Head: Normocephalic and atraumatic.      Nose: Nose normal.   Eyes:      Extraocular Movements: Extraocular movements intact.      Pupils: Pupils are equal, round, and reactive to light.   Cardiovascular:      Rate and Rhythm: Normal rate and regular rhythm.   Pulmonary:      Effort: Pulmonary effort is normal.      Breath sounds: Normal breath sounds.   Abdominal:      General: Abdomen is flat. Bowel sounds are normal.      Palpations: Abdomen is soft.   Genitourinary:     Penis: Normal.    Musculoskeletal:         General: Normal range of motion.      Cervical back: Neck supple.   Skin:     General: Skin is warm and dry.   Neurological:      General: No focal deficit present.      Mental Status: He is alert.     Laboratory  None    Diagnostic Results:  Ultrasound reviewed       Assessment and Plan   44-year-old white male with symptomatic soft tissue mass of the right flank.  Patient wishes to undergo excision of soft tissue mass for therapeutic and diagnostic evaluation.    PLAN:  Consent for surgery has been obtained after risks benefits and alternatives to procedure explained and all questions answered  Surgery planned for 12/20/2024         "

## 2024-12-19 ENCOUNTER — ANESTHESIA EVENT (OUTPATIENT)
Dept: SURGERY | Facility: HOSPITAL | Age: 44
End: 2024-12-19
Payer: MEDICAID

## 2024-12-19 NOTE — ANESTHESIA PREPROCEDURE EVALUATION
12/19/2024  Krish Prieto is a 44 y.o., male.      Pre-op Assessment    I have reviewed the Patient Summary Reports.     I have reviewed the Nursing Notes. I have reviewed the NPO Status.   I have reviewed the Medications.     Review of Systems  Anesthesia Hx:  No problems with previous Anesthesia             Denies Family Hx of Anesthesia complications.    Denies Personal Hx of Anesthesia complications.                    Social:  Non-Smoker       Cardiovascular:  Cardiovascular Normal                                              Pulmonary:  Pulmonary Normal                       Renal/:  Renal/ Normal                 Hepatic/GI:  Hepatic/GI Normal                    Musculoskeletal:  Musculoskeletal Normal                Neurological:  Neurology Normal                                      Endocrine:  Endocrine Normal            Psych:  Psychiatric Normal                  Physical Exam  General: Well nourished, Cooperative, Alert and Oriented    Airway:  Mallampati: II / II  Mouth Opening: Normal  TM Distance: Normal  Tongue: Normal  Neck ROM: Normal ROM    Dental:  Intact    Chest/Lungs:  Normal Respiratory Rate    Heart:  Rate: Normal    Musculoskeletal:  Normal mobility      Anesthesia Plan  Type of Anesthesia, risks & benefits discussed:    Anesthesia Type: MAC  Intra-op Monitoring Plan: Standard ASA Monitors  Post Op Pain Control Plan: multimodal analgesia  Induction:  IV  Informed Consent: Informed consent signed with the Patient and all parties understand the risks and agree with anesthesia plan.  All questions answered.   ASA Score: 2  Day of Surgery Review of History & Physical: H&P Update referred to the surgeon/provider.  Anesthesia Plan Notes: Anesthesia plan was discussed with patient and/or representative. Risks and alternatives were discussed including the possibility of alteration  of plan.     Ready For Surgery From Anesthesia Perspective.     .

## 2024-12-20 ENCOUNTER — HOSPITAL ENCOUNTER (OUTPATIENT)
Facility: HOSPITAL | Age: 44
Discharge: HOME | End: 2024-12-20
Attending: SURGERY | Admitting: SURGERY
Payer: MEDICAID

## 2024-12-20 ENCOUNTER — ANESTHESIA (OUTPATIENT)
Dept: SURGERY | Facility: HOSPITAL | Age: 44
End: 2024-12-20
Payer: MEDICAID

## 2024-12-20 VITALS
HEIGHT: 69 IN | SYSTOLIC BLOOD PRESSURE: 122 MMHG | HEART RATE: 68 BPM | BODY MASS INDEX: 36.49 KG/M2 | WEIGHT: 246.38 LBS | OXYGEN SATURATION: 98 % | TEMPERATURE: 98 F | DIASTOLIC BLOOD PRESSURE: 75 MMHG

## 2024-12-20 DIAGNOSIS — M79.89 MASS OF SOFT TISSUE: Primary | ICD-10-CM

## 2024-12-20 PROCEDURE — 36000706: Performed by: SURGERY

## 2024-12-20 PROCEDURE — 63600175 PHARM REV CODE 636 W HCPCS: Performed by: SURGERY

## 2024-12-20 PROCEDURE — 71000015 HC POSTOP RECOV 1ST HR: Performed by: SURGERY

## 2024-12-20 PROCEDURE — 37000009 HC ANESTHESIA EA ADD 15 MINS: Performed by: SURGERY

## 2024-12-20 PROCEDURE — 37000008 HC ANESTHESIA 1ST 15 MINUTES: Performed by: SURGERY

## 2024-12-20 PROCEDURE — 88304 TISSUE EXAM BY PATHOLOGIST: CPT | Performed by: SURGERY

## 2024-12-20 PROCEDURE — 36000707: Performed by: SURGERY

## 2024-12-20 PROCEDURE — 63600175 PHARM REV CODE 636 W HCPCS: Performed by: NURSE ANESTHETIST, CERTIFIED REGISTERED

## 2024-12-20 RX ORDER — PROPOFOL 10 MG/ML
VIAL (ML) INTRAVENOUS
Status: DISCONTINUED | OUTPATIENT
Start: 2024-12-20 | End: 2024-12-20

## 2024-12-20 RX ORDER — CEFAZOLIN SODIUM 1 G/3ML
2 INJECTION, POWDER, FOR SOLUTION INTRAMUSCULAR; INTRAVENOUS
Status: COMPLETED | OUTPATIENT
Start: 2024-12-20 | End: 2024-12-20

## 2024-12-20 RX ORDER — ONDANSETRON 4 MG/1
8 TABLET, ORALLY DISINTEGRATING ORAL EVERY 8 HOURS PRN
Status: DISCONTINUED | OUTPATIENT
Start: 2024-12-20 | End: 2024-12-20 | Stop reason: HOSPADM

## 2024-12-20 RX ORDER — MORPHINE SULFATE 2 MG/ML
3 INJECTION, SOLUTION INTRAMUSCULAR; INTRAVENOUS
Status: DISCONTINUED | OUTPATIENT
Start: 2024-12-20 | End: 2024-12-20 | Stop reason: HOSPADM

## 2024-12-20 RX ORDER — LIDOCAINE HYDROCHLORIDE AND EPINEPHRINE 10; 10 UG/ML; MG/ML
INJECTION, SOLUTION INFILTRATION; PERINEURAL
Status: DISCONTINUED | OUTPATIENT
Start: 2024-12-20 | End: 2024-12-20 | Stop reason: HOSPADM

## 2024-12-20 RX ORDER — HYDROCODONE BITARTRATE AND ACETAMINOPHEN 5; 325 MG/1; MG/1
1 TABLET ORAL EVERY 4 HOURS PRN
Status: DISCONTINUED | OUTPATIENT
Start: 2024-12-20 | End: 2024-12-20 | Stop reason: HOSPADM

## 2024-12-20 RX ORDER — MIDAZOLAM HYDROCHLORIDE 1 MG/ML
INJECTION INTRAMUSCULAR; INTRAVENOUS
Status: DISCONTINUED | OUTPATIENT
Start: 2024-12-20 | End: 2024-12-20

## 2024-12-20 RX ORDER — SODIUM CHLORIDE 9 MG/ML
INJECTION, SOLUTION INTRAVENOUS CONTINUOUS
Status: DISCONTINUED | OUTPATIENT
Start: 2024-12-20 | End: 2024-12-20 | Stop reason: HOSPADM

## 2024-12-20 RX ORDER — ONDANSETRON HYDROCHLORIDE 2 MG/ML
4 INJECTION, SOLUTION INTRAVENOUS EVERY 12 HOURS PRN
Status: DISCONTINUED | OUTPATIENT
Start: 2024-12-20 | End: 2024-12-20 | Stop reason: HOSPADM

## 2024-12-20 RX ORDER — OXYCODONE AND ACETAMINOPHEN 5; 325 MG/1; MG/1
1 TABLET ORAL EVERY 6 HOURS PRN
Qty: 15 TABLET | Refills: 0 | Status: SHIPPED | OUTPATIENT
Start: 2024-12-20

## 2024-12-20 RX ORDER — LIDOCAINE HYDROCHLORIDE 10 MG/ML
INJECTION, SOLUTION EPIDURAL; INFILTRATION; INTRACAUDAL; PERINEURAL
Status: DISCONTINUED | OUTPATIENT
Start: 2024-12-20 | End: 2024-12-20

## 2024-12-20 RX ORDER — FENTANYL CITRATE 50 UG/ML
INJECTION, SOLUTION INTRAMUSCULAR; INTRAVENOUS
Status: DISCONTINUED | OUTPATIENT
Start: 2024-12-20 | End: 2024-12-20

## 2024-12-20 RX ADMIN — LIDOCAINE HYDROCHLORIDE 20 MG: 10 INJECTION, SOLUTION EPIDURAL; INFILTRATION; INTRACAUDAL; PERINEURAL at 11:12

## 2024-12-20 RX ADMIN — MIDAZOLAM HYDROCHLORIDE 2 MG: 1 INJECTION, SOLUTION INTRAMUSCULAR; INTRAVENOUS at 11:12

## 2024-12-20 RX ADMIN — ONDANSETRON HYDROCHLORIDE 4 MG: 2 SOLUTION INTRAMUSCULAR; INTRAVENOUS at 11:12

## 2024-12-20 RX ADMIN — PROPOFOL 150 MG: 10 INJECTION, EMULSION INTRAVENOUS at 11:12

## 2024-12-20 RX ADMIN — CEFAZOLIN 2 G: 330 INJECTION, POWDER, FOR SOLUTION INTRAMUSCULAR; INTRAVENOUS at 11:12

## 2024-12-20 RX ADMIN — FENTANYL CITRATE 50 MCG: 50 INJECTION INTRAMUSCULAR; INTRAVENOUS at 11:12

## 2024-12-20 RX ADMIN — PROPOFOL 100 MG: 10 INJECTION, EMULSION INTRAVENOUS at 11:12

## 2024-12-20 NOTE — OP NOTE
Procedure date: 12/20/2024     Indications: 44-year-old white male with symptomatic soft tissue mass of the right flank elected to undergo excision for therapeutic treatment     Preoperative diagnosis: Soft tissue mass right flank   Postoperative diagnosis:  Subcutaneous lipoma right flank     Procedure performed: Wide excision of soft tissue mass of the right flank     Procedure in detail:  The patient was brought to the operative theater laid in left lateral decubitus position right side up.  Intravenous anesthesia provided.  Preoperative antibiotics administered.  The area of the right flank was then sterilely prepped and draped in normal surgical fashion using chlorhexidine.  1% lidocaine with epinephrine infiltrate the subcutaneous tissues over the right flank.  Fifteen blade was then used to incise the skin with dissection down the fatty tissues.  Bovie cauterization carry down the dissection circumferential manner surrounding underlying soft tissue mass consistent with a lipoma.  It was extracted in total measured proximally 2 cm in diameter.  It was sent to pathology.  The cavity was made hemostatic with Bovie cauterization.  The wound edges reapproximated in a multilayered fashion using 3-0 Vicryl and running 4-0 subcuticular Monocryl.  A sterile dressing was then placed upon the wound site.  The patient was then relieved of anesthesia stable condition and transferred to postanesthesia care unit.    Complications: None   Estimated blood loss:  5 cc   Specimens:  2 cm subcutaneous lipoma     Disposition: Upon recovering from anesthesia patient will be discharged home with a follow up in surgery Clinic in 1 week.      Estefany Dill MD

## 2024-12-20 NOTE — ANESTHESIA POSTPROCEDURE EVALUATION
Anesthesia Post Evaluation    Patient: Krish Prieto    Procedure(s) Performed: Procedure(s) (LRB):  EXCISION, MASS, BACK (Right)    Final Anesthesia Type: MAC      Patient location during evaluation: med/surg floor  Patient participation: Yes- Able to Participate  Level of consciousness: awake and alert  Post-procedure vital signs: reviewed and stable  Pain management: adequate  Airway patency: patent    PONV status at discharge: No PONV  Anesthetic complications: no      Cardiovascular status: blood pressure returned to baseline  Respiratory status: unassisted  Hydration status: euvolemic  Follow-up not needed.              Vitals Value Taken Time   /90 12/20/24 0948   Temp 36.7 °C (98 °F) 12/20/24 0947   Pulse 78 12/20/24 0947   Resp 16 12/18/24 1027   SpO2 98 % 12/20/24 0947         No case tracking events are documented in the log.      Pain/Eusebio Score: No data recorded

## 2024-12-20 NOTE — DISCHARGE INSTRUCTIONS
Keep dressings dry and in place for 2 days (48 hours).    Once 48 hours have passed, remove top gauze dressing but leave the steri strips that are under the gauze in place until they fall off on their own.     Once dressings are removed you can then take a shower and wet the site but do not submerge incisions in water (NO tub baths, spa hot tub, pools).     Do not drive or operate any heavy machinery for 24 hours     Do not sign any important documents for 24 hours    Resume medications and diet as per normal    Follow up as scheduled

## 2024-12-23 LAB — PSYCHE PATHOLOGY RESULT: NORMAL

## (undated) DEVICE — GLOVE SENSICARE PI SURG 8

## (undated) DEVICE — SPONGE GAUZE 16PLY 4X4

## (undated) DEVICE — SOL IRRI STRL WATER 1000ML

## (undated) DEVICE — SPONGE DERMA 8PLY 2X2

## (undated) DEVICE — GOWN ECLIPSE REINF LVL4 TWL XL

## (undated) DEVICE — BLADE SURG CARBON STEEL SZ11

## (undated) DEVICE — GLOVE SENSICARE PI SURG 7

## (undated) DEVICE — DRESSING TRANS 2X2 TEGADERM

## (undated) DEVICE — GEL AQUASONIC 100 STERILE20GM

## (undated) DEVICE — ELECTRODE REM PLYHSV RETURN 9

## (undated) DEVICE — GLOVE SENSICARE PI SURG 6.5

## (undated) DEVICE — Device

## (undated) DEVICE — PENCIL SMOKE EVAC TELSCP 15FT

## (undated) DEVICE — SPONGE LAP 18X18 PREWASHED

## (undated) DEVICE — ADHESIVE SURG LIQ 2 OZ

## (undated) DEVICE — SUT MONOCRYL 4-0 PS-2

## (undated) DEVICE — KIT PROBE COVER GEL 6X72IN

## (undated) DEVICE — SUT CTD VICRYL 3-0 CR/SH

## (undated) DEVICE — NDL HYPO POLYPR STD 26G 1.5IN

## (undated) DEVICE — COVER LIGHT HANDLE FLEX GRN

## (undated) DEVICE — GLOVE PROTEXIS HYDROGEL SZ8

## (undated) DEVICE — GOWN ECLIPSE REINF LV4 TWL 2XL

## (undated) DEVICE — GLOVE SENSICARE PI GRN 7